# Patient Record
(demographics unavailable — no encounter records)

---

## 2024-10-10 NOTE — REASON FOR VISIT
[Follow-Up Visit] : a follow-up visit [FreeTextEntry2] : Eugene is a 15 year old girl with ADHD and anxiety seen for a follow-up visit to discuss medication management and treatment recommendations.  [FreeTextEntry4] : Lexapro 5 mg po daily (takes in the PM) Cotempla 25.9 mg po QAM (for school days and prn on non-school days) Melatonin QHS prn [FreeTextEntry1] : MARIANA Knight [FreeTextEntry5] : Medical records

## 2024-10-10 NOTE — PHYSICAL EXAM
[Normal] : awake and interactive [Smiles responsively] : smiles responsively [Positive mood] : positive mood [Answered questions appropriately] : answered questions appropriately [de-identified] : Interviewed alone. Denied vaping, ETOH, tobacco use, and drug use. Denied mood symptoms. Denied SI.

## 2024-10-10 NOTE — END OF VISIT
[Time Spent: ___ minutes] : I have spent [unfilled] minutes of time on the encounter which excludes teaching and separately reported services. [1. Privacy issue, precluded by [facility] or Federal Law] : Reason - Privacy issue, precluded by [facility] or Federal Law

## 2024-10-10 NOTE — PLAN
[Findings (To Date)] : Findings from evaluation (to date) [Goals / Benefits] : Goals & potential benefits of treatment with medication, as well as the limitations of pharmacotherapy [Stimulants] : Potential benefits and limitations of treatment with stimulant medication.  Potential adverse events were also reviewed, including insomnia, reduced appetite, change in blood pressure or heart rate, headache, stomachache, slowing of growth, moodiness, and onset of tics [AE Strategies] : Strategies to reduce side effects from current or proposed medication regimen [SSRIs] : Potential benefits and limitations of treatment with SSRIs.  Potential adverse events were also reviewed, including suicidal ideation, andrei/activation, nausea, headache, diarrhea/constipation, somnolence, vision changes, rash, etc.  Advised to seek immediate medical attention if any severe side effects or suicidal ideation. [CAM Therapies] : Benefits and limits of CAM therapies [504] : Entitlements under Section 504 of the Americans with Disabilities Act ("accommodation plans") [Family Questions] : Family's questions were addressed [Sleep] : The importance of sleep and strategies to ensure adequate sleep [FreeTextEntry3] : Current recommendations: - Will continue Cotempla 25.9 mg po QAM for school days, will monitor for efficacy and SE - Discussed potential side effects of stimulant medications including but not limited to increased heart rate and blood pressure, decreased appetite, decreased growth velocity, headache, stomachache, delayed sleep onset, tics, moodiness, etc. - Continue to monitor weight and height - Continue melatonin QHS prn - Continue Lexapro and have a trial of 7.5x 1-2 weeks and then increase to 10 mg po daily to better target anxiety and panic symptoms, monitor for efficacy and SE - Discussed potential SE of SSRIs including but not limited to suicidal ideation, andrei/activation, nausea, headache, diarrhea/constipation, somnolence, vision changes, rash, etc.  To seek immediate medical attention if any severe side effects or suicidal ideation.   - Continue 504 plan - Encouragement given about progress - Encouragement given about seeking out new therapist for CBT  Previous recommendations: - Consider adding l-theanine and magnesium in the morning to help with stress - Consider saffron to help with ADHD and PMS symptoms - s/p fish oil - The Shyness and Social Anxiety Workbook for Teens: CBT and ACT Skills to Help You Build Social Confidence by Margarita Ng - 1,2,3 Magic Teen by Dr. Kumar - Ophthalmologist recommended a genetics evaluation, provided with contact information at the last visit, pediatrician recommended holding off for now - Pediatrician is ok with Eugene waiting on COVID-19 vaccine because she is sensitive to vaccines in general  Follow-up: - Call prn and in 1 month - F/U in January 2025   Billing: Level 4 E/M due to time (30 minutes),  because patient is being followed longitudinally for at least one chronic condition by a single provider  [Diet] : Evidence-based clinical information about diet

## 2024-10-10 NOTE — REVIEW OF SYSTEMS
[Anxiety] : anxiety [Normal] : Hematologic/Lymphatic [FreeTextEntry2] : history of poor weight gain [FreeTextEntry3] : wears glasses/contact lenses, has cataracts developing, known to ophthalmology [FreeTextEntry4] : s/p palate expander,  s/p braces [FreeTextEntry5] : cleared by cardiology in the past [FreeTextEntry6] : s/p COVID-19 in January 2021 and January 2022 [de-identified] :  s/p right shoulder buckle fracture in July 2022 , s/p finger injury and ankle injury (due to cheer) [de-identified] : history of alopecia and hair thinning, eczema, previously known to dermatology [de-identified] : history of hemangioma on scalp that resolved [de-identified] : panic symptoms

## 2024-10-10 NOTE — HISTORY OF PRESENT ILLNESS
[FreeTextEntry5] : 10th grade.  Has a 504 plan. Gets counseling in a group every other week and Eugene really likes it.  [FreeTextEntry1] : Since the last visit, Eugene has not had any medication changes.   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 She has had a significant decrease in panic symptoms but it can still happen - had some panic symptoms yesterday after getting fearful about catching a GI virus from a friend. Her somatic symptoms have also decreased overall (heart racing, stomachaches, headaches, etc.). She has less social anxiety and is speaking more to unfamiliar adults. She says that she is obsessing less about school and grades.   Eugene's mother feels like her anxiety is more manageable so she is better able to engage in coping skills but does feel like there is room for improvement.   She has received CBT in the past and exploring options for a new therapist.  It has been challenging.   Her grades are excellent.   Eugene's attention is good in school and for homework.   She takes melatonin to help with sleep onset.  [de-identified] : [employer] [Major Illness] : no major illness [Surgery] : no surgery [Hospitalizations] : no hospitalizations [FreeTextEntry6] : sedation has resolved, denies SI, vivid but good dreams decreased appetite on Cotempla

## 2025-01-30 NOTE — PHYSICAL EXAM
[Normal] : awake and interactive [Smiles responsively] : smiles responsively [Positive mood] : positive mood [Answered questions appropriately] : answered questions appropriately

## 2025-01-30 NOTE — END OF VISIT
[Time Spent: ___ minutes] : I have spent [unfilled] minutes of time on the encounter which excludes teaching and separately reported services. [1. Privacy issue, precluded by [facility] or Federal Law] : Reason - Privacy issue, precluded by [facility] or Federal Law

## 2025-01-30 NOTE — PLAN
[Findings (To Date)] : Findings from evaluation (to date) [Goals / Benefits] : Goals & potential benefits of treatment with medication, as well as the limitations of pharmacotherapy [Stimulants] : Potential benefits and limitations of treatment with stimulant medication.  Potential adverse events were also reviewed, including insomnia, reduced appetite, change in blood pressure or heart rate, headache, stomachache, slowing of growth, moodiness, and onset of tics [AE Strategies] : Strategies to reduce side effects from current or proposed medication regimen [SSRIs] : Potential benefits and limitations of treatment with SSRIs.  Potential adverse events were also reviewed, including suicidal ideation, andrei/activation, nausea, headache, diarrhea/constipation, somnolence, vision changes, rash, etc.  Advised to seek immediate medical attention if any severe side effects or suicidal ideation. [CAM Therapies] : Benefits and limits of CAM therapies [504] : Entitlements under Section 504 of the Americans with Disabilities Act ("accommodation plans") [Family Questions] : Family's questions were addressed [Diet] : Evidence-based clinical information about diet [Sleep] : The importance of sleep and strategies to ensure adequate sleep [Counseling] : Benefits and limits of counseling or therapy [Resources] : Other available resources [FreeTextEntry3] :  Current recommendations: - Will continue Cotempla and decrease to 17.3 mg po QAM for school days, will see if effective, can increase back to 25.9 mg if needed, will monitor for efficacy and SE - Discussed potential side effects of stimulant medications including but not limited to increased heart rate and blood pressure, decreased appetite, decreased growth velocity, headache, stomachache, delayed sleep onset, tics, moodiness, etc. - Continue to monitor weight and height - Continue to monitor feelings of her heart racing, advised to discontinue all caffeine and monitor symptoms, referred back to cardiology since she does have panic attacks and hypochondriasis - Continue melatonin QHS prn - Continue Lexapro 10 mg po daily to better target anxiety and panic symptoms, monitor for efficacy and SE - Discussed potential SE of SSRIs including but not limited to suicidal ideation, andrei/activation, nausea, headache, diarrhea/constipation, somnolence, vision changes, rash, etc.  To seek immediate medical attention if any severe side effects or suicidal ideation.   - Continue 504 plan - Encouragement given about progress - Encouragement given about seeking out new therapist for CBT/ERP, referred to [email or web address]   Previous recommendations: - Consider adding l-theanine and magnesium in the morning to help with stress - Consider saffron to help with ADHD and PMS symptoms - s/p fish oil - The Shyness and Social Anxiety Workbook for Teens: CBT and ACT Skills to Help You Build Social Confidence by Margarita Ng - 1,2,3 Magic Teen by Dr. Kumar - Ophthalmologist recommended a genetics evaluation, provided with contact information at the last visit, pediatrician recommended holding off for now - Pediatrician is ok with Eugene waiting on COVID-19 vaccine because she is sensitive to vaccines in general  Follow-up: - Call prn  - F/U in April 2025   Billing: Level 5 E/M due to time (40 minutes face-to-face time, 5 minutes documentation and review of records),  because patient is being followed longitudinally for at least one chronic condition by a single provider

## 2025-01-30 NOTE — REVIEW OF SYSTEMS
[Anxiety] : anxiety [Normal] : Hematologic/Lymphatic [FreeTextEntry2] : history of poor weight gain [FreeTextEntry3] : wears glasses/contact lenses, has cataracts developing, known to ophthalmology [FreeTextEntry4] : s/p palate expander,  s/p braces [FreeTextEntry5] : cleared by cardiology in the past [FreeTextEntry6] : s/p COVID-19 in January 2021 and January 2022 [de-identified] :  s/p right shoulder buckle fracture in July 2022 , s/p finger injury and ankle injury (due to cheer) [de-identified] : history of alopecia and hair thinning, eczema, previously known to dermatology [de-identified] : history of hemangioma on scalp that resolved [de-identified] : panic symptoms

## 2025-01-30 NOTE — REASON FOR VISIT
[Follow-Up Visit] : a follow-up visit [FreeTextEntry2] : Eugene is a 15-year-old girl with ADHD and anxiety seen for a follow-up visit to discuss medication management and treatment recommendations.  [FreeTextEntry4] : Lexapro 10 mg po daily (takes in the PM) Cotempla 25.9 mg po QAM (for school days and prn on non-school days) Melatonin QHS prn [FreeTextEntry1] : MARIANA Knight [FreeTextEntry5] : Medical records

## 2025-04-10 NOTE — REASON FOR VISIT
[Follow-Up Visit] : a follow-up visit [FreeTextEntry2] : Eugene is a 16-year-old girl with ADHD and anxiety seen for a follow-up visit to discuss medication management and treatment recommendations.  [FreeTextEntry4] : Lexapro 10 mg po daily (takes in the PM) Cotempla 17.3 mg po QAM (for school days and prn on non-school days) Melatonin QHS prn [FreeTextEntry1] : MARIANA Knight [FreeTextEntry5] : Medical records, rating scales

## 2025-04-10 NOTE — PLAN
[Findings (To Date)] : Findings from evaluation (to date) [Goals / Benefits] : Goals & potential benefits of treatment with medication, as well as the limitations of pharmacotherapy [Stimulants] : Potential benefits and limitations of treatment with stimulant medication.  Potential adverse events were also reviewed, including insomnia, reduced appetite, change in blood pressure or heart rate, headache, stomachache, slowing of growth, moodiness, and onset of tics [AE Strategies] : Strategies to reduce side effects from current or proposed medication regimen [SSRIs] : Potential benefits and limitations of treatment with SSRIs.  Potential adverse events were also reviewed, including suicidal ideation, andrei/activation, nausea, headache, diarrhea/constipation, somnolence, vision changes, rash, etc.  Advised to seek immediate medical attention if any severe side effects or suicidal ideation. [CAM Therapies] : Benefits and limits of CAM therapies [Counseling] : Benefits and limits of counseling or therapy [Resources] : Other available resources [504] : Entitlements under Section 504 of the Americans with Disabilities Act ("accommodation plans") [Family Questions] : Family's questions were addressed [Diet] : Evidence-based clinical information about diet [Sleep] : The importance of sleep and strategies to ensure adequate sleep [Rating Scales] : Clinical implications of rating scales [FreeTextEntry3] : Current recommendations: - Will continue Cotempla 17.3 mg po QAM for school days, will monitor for efficacy and SE - Discussed potential side effects of stimulant medications including but not limited to increased heart rate and blood pressure, decreased appetite, decreased growth velocity, headache, stomachache, delayed sleep onset, tics, moodiness, etc. - Continue melatonin QHS prn - Continue Lexapro  and have a trial of 15 mg po daily to better target anxiety and panic symptoms, monitor for efficacy and SE - Discussed potential SE of SSRIs including but not limited to suicidal ideation, andrei/activation, nausea, headache, diarrhea/constipation, somnolence, vision changes, rash, etc.  To seek immediate medical attention if any severe side effects or suicidal ideation.   - Continue 504 plan - Encouragement given about progress  Previous recommendations: - Continue to monitor weight and height - Encouragement given about seeking out new therapist for CBT/ERP, referred to [email or web address]  - Consider adding l-theanine and magnesium in the morning to help with stress - Consider saffron to help with ADHD and PMS symptoms - s/p fish oil - The Shyness and Social Anxiety Workbook for Teens: CBT and ACT Skills to Help You Build Social Confidence by Margarita Ng - 1,2,3 Magic Teen by Dr. Kumar - Ophthalmologist recommended a genetics evaluation, provided with contact information at the last visit, pediatrician recommended holding off for now - Pediatrician is ok with Eugene waiting on COVID-19 vaccine because she is sensitive to vaccines in general  Follow-up: - Call prn  - F/U in May 2025   Billing: Level 4 E/M due to time (30 minutes),  because patient is being followed longitudinally for at least one chronic condition by a single provider, [id number] x 2 for rating scales

## 2025-04-10 NOTE — REVIEW OF SYSTEMS
[Anxiety] : anxiety [Normal] : Hematologic/Lymphatic [FreeTextEntry2] : history of poor weight gain [FreeTextEntry3] : wears glasses/contact lenses, has cataracts developing, known to ophthalmology [FreeTextEntry4] : s/p palate expander,  s/p braces [FreeTextEntry5] : cleared by cardiology in the past [FreeTextEntry6] : s/p COVID-19 in January 2021 and January 2022 [de-identified] :  s/p right shoulder buckle fracture in July 2022 , s/p finger injury and ankle injury (due to cheer) [de-identified] : history of alopecia and hair thinning, eczema, previously known to dermatology [de-identified] : history of hemangioma on scalp that resolved [de-identified] : panic symptoms

## 2025-04-10 NOTE — REVIEW OF SYSTEMS
[Anxiety] : anxiety [Normal] : Hematologic/Lymphatic [FreeTextEntry2] : history of poor weight gain [FreeTextEntry3] : wears glasses/contact lenses, has cataracts developing, known to ophthalmology [FreeTextEntry4] : s/p palate expander,  s/p braces [FreeTextEntry5] : cleared by cardiology in the past [FreeTextEntry6] : s/p COVID-19 in January 2021 and January 2022 [de-identified] :  s/p right shoulder buckle fracture in July 2022 , s/p finger injury and ankle injury (due to cheer) [de-identified] : history of alopecia and hair thinning, eczema, previously known to dermatology [de-identified] : history of hemangioma on scalp that resolved [de-identified] : panic symptoms

## 2025-04-10 NOTE — END OF VISIT
[Time Spent: ___ minutes] : I have spent [unfilled] minutes of time on the encounter which excludes teaching and separately reported services. [1. Privacy issue, precluded by [facility] or Federal Law] : Reason - Privacy issue, precluded by [facility] or Federal Law

## 2025-04-10 NOTE — HISTORY OF PRESENT ILLNESS
[FreeTextEntry5] : 10th grade.  Has a 504 plan. Gets counseling in a group every other week and Eugene really likes it.  [FreeTextEntry1] : Since the last visit, Eugene has continued on Lexapro 10 mg po daily. It is well-tolerated. She feels like there is room for improvement in terms of her anxiety symptoms and panic symptoms.   Eugene has not had any racing heart rate since she went down on Cotempla. Family decided to hold off on seeing cardiology again since she had been cleared in the past and it did seem to be associated with the higher dose of Cotempla +/- caffeine. She has stopped drinking caffeine as well.   Eugene feels that the Cotempla is effective and that the lower dose is targeting her symptoms well. Eugene's attention is good in school and for homework.   She has received CBT in the past. It has been challenging finding a therapist.   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 She can have intrusive thoughts. This has improved on Lexapro but persists.   Her grades are excellent. Her GPA is 4.09. She is very independent with her work. She is interested in a career in criminal justice and/or psychology. She is going on a [place] trip [place] and seems excited and anxious about it.   She takes melatonin to help with sleep onset.  [de-identified] : [employer] [Major Illness] : no major illness [Surgery] : no surgery [Hospitalizations] : no hospitalizations [FreeTextEntry6] : sedation has resolved, denies SI, vivid but good dreams decreased appetite on Cotempla

## 2025-04-10 NOTE — HISTORY OF PRESENT ILLNESS
[FreeTextEntry5] : 10th grade.  Has a 504 plan. Gets counseling in a group every other week and Eugene really likes it.  [FreeTextEntry1] : Since the last visit, Eugene has continued on Lexapro 10 mg po daily. It is well-tolerated. She feels like there is room for improvement in terms of her anxiety symptoms and panic symptoms.   Eugene has not had any racing heart rate since she went down on Cotempla. Family decided to hold off on seeing cardiology again since she had been cleared in the past and it did seem to be associated with the higher dose of Cotempla +/- caffeine. She has stopped drinking caffeine as well.   Eugene feels that the Cotempla is effective and that the lower dose is targeting her symptoms well. Eugene's attention is good in school and for homework.   She has received CBT in the past. It has been challenging finding a therapist.   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 She can have intrusive thoughts. This has improved on Lexapro but persists.   Her grades are excellent. Her GPA is 4.09. She is very independent with her work. She is interested in a career in criminal justice and/or psychology. She is going on a school field trip to the retirement and seems excited and anxious about it.   She takes melatonin to help with sleep onset.  [de-identified] : [place] [employer] [Major Illness] : no major illness [Surgery] : no surgery [Hospitalizations] : no hospitalizations [FreeTextEntry6] : sedation has resolved, denies SI, vivid but good dreams decreased appetite on Cotempla

## 2025-06-25 NOTE — END OF VISIT
[Time Spent: ___ minutes] : I have spent [unfilled] minutes of time on the encounter which excludes teaching and separately reported services. [1. Privacy issue, precluded by [facility] or Federal Law] : Reason - Privacy issue, precluded by [facility] or Federal Law

## 2025-06-25 NOTE — REVIEW OF SYSTEMS
[Anxiety] : anxiety [Normal] : Hematologic/Lymphatic [FreeTextEntry2] : history of poor weight gain [FreeTextEntry3] : wears glasses/contact lenses, has cataracts developing, known to ophthalmology [FreeTextEntry4] : s/p palate expander,  s/p braces [FreeTextEntry5] : cleared by cardiology in the past [FreeTextEntry6] : s/p COVID-19 in January 2021 and January 2022 [de-identified] :  s/p right shoulder buckle fracture in July 2022 , s/p finger injury and ankle injury (due to cheer) [de-identified] : history of alopecia and hair thinning, eczema, previously known to dermatology [de-identified] : history of hemangioma on scalp that resolved [de-identified] : panic symptoms

## 2025-06-25 NOTE — REVIEW OF SYSTEMS
[Anxiety] : anxiety [Normal] : Hematologic/Lymphatic [FreeTextEntry2] : history of poor weight gain [FreeTextEntry3] : wears glasses/contact lenses, has cataracts developing, known to ophthalmology [FreeTextEntry4] : s/p palate expander,  s/p braces [FreeTextEntry5] : cleared by cardiology in the past [FreeTextEntry6] : s/p COVID-19 in January 2021 and January 2022 [de-identified] :  s/p right shoulder buckle fracture in July 2022 , s/p finger injury and ankle injury (due to cheer) [de-identified] : history of alopecia and hair thinning, eczema, previously known to dermatology [de-identified] : history of hemangioma on scalp that resolved [de-identified] : panic symptoms

## 2025-06-25 NOTE — PLAN
[Findings (To Date)] : Findings from evaluation (to date) [Rating Scales] : Clinical implications of rating scales [Goals / Benefits] : Goals & potential benefits of treatment with medication, as well as the limitations of pharmacotherapy [Stimulants] : Potential benefits and limitations of treatment with stimulant medication.  Potential adverse events were also reviewed, including insomnia, reduced appetite, change in blood pressure or heart rate, headache, stomachache, slowing of growth, moodiness, and onset of tics [AE Strategies] : Strategies to reduce side effects from current or proposed medication regimen [SSRIs] : Potential benefits and limitations of treatment with SSRIs.  Potential adverse events were also reviewed, including suicidal ideation, andrei/activation, nausea, headache, diarrhea/constipation, somnolence, vision changes, rash, etc.  Advised to seek immediate medical attention if any severe side effects or suicidal ideation. [CAM Therapies] : Benefits and limits of CAM therapies [Counseling] : Benefits and limits of counseling or therapy [Resources] : Other available resources [504] : Entitlements under Section 504 of the Americans with Disabilities Act ("accommodation plans") [Family Questions] : Family's questions were addressed [Diet] : Evidence-based clinical information about diet [Sleep] : The importance of sleep and strategies to ensure adequate sleep [FreeTextEntry3] :  Current recommendations: - Will continue Cotempla 17.3 mg po QAM for school days, will monitor for efficacy and SE - Discussed potential side effects of stimulant medications including but not limited to increased heart rate and blood pressure, decreased appetite, decreased growth velocity, headache, stomachache, delayed sleep onset, tics, moodiness, etc. - Continue melatonin QHS prn - Continue Lexapro 15 mg po daily to target anxiety and panic symptoms, monitor for efficacy and SE - Discussed potential SE of SSRIs including but not limited to suicidal ideation, andrei/activation, nausea, headache, diarrhea/constipation, somnolence, vision changes, rash, etc.  To seek immediate medical attention if any severe side effects or suicidal ideation.   - Continue 504 plan - Encouragement given about progress  Previous recommendations: - Continue to monitor weight and height - Encouragement given about seeking out new therapist for CBT/ERP, [facility] to [email or web address]  - Consider adding l-theanine and magnesium in the morning to help with stress - Consider saffron to help with ADHD and PMS symptoms - s/p fish oil - The Shyness and Social Anxiety Workbook for Teens: CBT and ACT Skills to Help You Build Social Confidence by Margarita Ng - 1,2,3 Magic Teen by Dr. Kumar - Ophthalmologist recommended a genetics evaluation, provided with contact information at the last visit, pediatrician recommended holding off for now - Pediatrician is ok with Eugene waiting on COVID-19 vaccine because she is sensitive to vaccines in general  Follow-up: - Call prn  - F/U in Fall 2025   Billing: Level 5 E/M due to time (40 minutes),  because patient is being followed longitudinally for at least one chronic condition by a single provider

## 2025-06-25 NOTE — END OF VISIT
[Time Spent: ___ minutes] : I have spent [unfilled] minutes of time on the encounter which excludes teaching and separately reported services. [1. Privacy issue, precluded by [facility] or Federal Law] : Reason - Privacy issue, precluded by [facility] or Federal Law

## 2025-06-25 NOTE — REASON FOR VISIT
[Follow-Up Visit] : a follow-up visit [Mother] : mother [Home] : at home, [unfilled] , at the time of the visit. [Other Location: e.g. Home (Enter Location, City,State)___] : at [unfilled] [Telehealth (audio & video)] : This visit was provided via telehealth using real-time 2-way audio visual technology. [FreeTextEntry3] : Mother [FreeTextEntry2] : Eugene is a 16-year-old girl with ADHD and anxiety seen for a follow-up visit to discuss medication management and treatment recommendations.  [FreeTextEntry4] : Lexapro 15 mg po daily (takes in the PM) Cotempla 17.3 mg po QAM (for school days and prn on non-school days) Melatonin 1 mg po QHS prn [FreeTextEntry1] : MARIANA Knight [FreeTextEntry5] : Medical records

## 2025-06-25 NOTE — HISTORY OF PRESENT ILLNESS
[FreeTextEntry5] : 10th grade.  Has a 504 plan. Gets counseling in a group every other week and Eugene really likes it.  [FreeTextEntry1] : Since the last visit, Eugene has continued on Lexapro and increased from 10 to 15 mg po daily. It is well-tolerated. She feels like it is helping with her anxiety and panic symptoms. She cannot remember the last time that she had a panic attack. She also had a significant decrease in her social anxiety and now feels like a "social butterfly."   Eugene has not had any racing heart rate since she went down on Cotempla. Family decided to hold off on seeing cardiology again since she had been cleared in the past and it did seem to be associated with the higher dose of Cotempla +/- caffeine. She has stopped drinking caffeine as well  Eugene feels that the Cotempla is effective and that the lower dose is targeting her symptoms well. Eugene's attention is good in school and for homework. She feels like her attention span is a 7-8/10 for school.   She has received CBT in the past. It has been challenging finding a therapist. She would like to work with someone in-person.   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 She can have intrusive thoughts, but there are more manageable. This has improved on Lexapro but persists.   Her grades are excellent. Her GPA is 4.1. She is very independent with her work. She is interested in a career in criminal justice and/or psychology.  She takes melatonin to help with sleep onset. She is feeling less anxious at night.   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 She has feelings of irritability and anger with certain sounds (e.g., her mother chewing or coughing).  [de-identified] : Cheerleading Track [Major Illness] : no major illness [Surgery] : no surgery [Hospitalizations] : no hospitalizations [FreeTextEntry6] : sedation has resolved, denies SI, vivid but good dreams decreased appetite on Cotempla

## 2025-06-25 NOTE — HISTORY OF PRESENT ILLNESS
[FreeTextEntry5] : 10th grade.  Has a 504 plan. Gets counseling in a group every other week and Eugene really likes it.  [FreeTextEntry1] : Since the last visit, Eugene has continued on Lexapro and increased from 10 to 15 mg po daily. It is well-tolerated. She feels like it is helping with her anxiety and panic symptoms. She cannot remember the last time that she had a panic attack. She also had a significant decrease in her social anxiety and now feels like a "social butterfly."   Eugene has not had any racing heart rate since she went down on Cotempla. Family decided to hold off on seeing cardiology again since she had been cleared in the past and it did seem to be associated with the higher dose of Cotempla +/- caffeine. She has stopped drinking caffeine as well  Eugene feels that the Cotempla is effective and that the lower dose is targeting her symptoms well. Eugene's attention is good in school and for homework. She feels like her attention span is a 7-8/10 for school.   She has received CBT in the past. It has been challenging finding a therapist. She would like to work with someone in-person.   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 She can have intrusive thoughts, but there are more manageable. This has improved on Lexapro but persists.   Her grades are excellent. Her GPA is 4.1. She is very independent with her work. She is interested in a career in criminal justice and/or psychology.  She takes melatonin to help with sleep onset. She is feeling less anxious at night.   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 She has feelings of irritability and anger with certain sounds (e.g., her mother chewing or coughing).  [de-identified] : Cheerleading Track [Major Illness] : no major illness [Surgery] : no surgery [Hospitalizations] : no hospitalizations [FreeTextEntry6] : sedation has resolved, denies SI, vivid but good dreams decreased appetite on Cotempla

## 2025-06-26 NOTE — HISTORY OF PRESENT ILLNESS
[Sudden] : sudden [Dull/Aching] : dull/aching [Throbbing] : throbbing [Intermittent] : intermittent [Household chores] : household chores [Leisure] : leisure [Social interactions] : social interactions [Rest] : rest [Student] : Work status: student [6] : 6 [1] : 2 [Sharp] : sharp [Stabbing] : stabbing [de-identified] : Patient here for right ankle. Patient states she rolled ankle 6/2025. Patient states she has rolled this ankle 3-4x due to cheerleading. Patient states pain has been on/off since 2018. Patient states pain is in the lateral aspect of the ankle that is aching and sharp and shooting with impact related activity. Patient came into office ambulating on her own in sneakers with mother.  [] : Post Surgical Visit: no [FreeTextEntry1] : Right ankle  [FreeTextEntry3] : 2018 [FreeTextEntry5] : [facility]  [de-identified] : Movement  not applicable (Male)

## 2025-06-26 NOTE — PHYSICAL EXAM
[de-identified] : Examination of the right foot and ankle is as follows: INSPECTION: no swelling, no ecchymosis, no abrasion, laceration, no erythema PALPATION: lateral ligament tenderness ROM: plantarflexion 40 degrees, inversion 30 degrees, eversion 20 degrees, dorsiflexion 20 degrees STRENGTH: dorsiflexion 5/5, plantar flexion 5/5, inversion 5/5, eversion 5/5, EHL 5/5, FHL 5/5 TESTING: + anterior drawer VASCULAR: dorsalis pedis pulse: 2+, posterior tibialis pulse: 2+ NEURO: Sensation present to light touch in all distributions GAIT: mildly antalgic, ambulation without assisted devices   X-rays of the right ankle is as follows: Ankle 3 view AP/Lateral/Oblique:: No fractures, subluxations or dislocations. No major abnormalities.

## 2025-06-26 NOTE — ASSESSMENT
[FreeTextEntry1] : 16yoF with right ankle instability for past several years.  Will trial conservative management at this time.  -rx for PT given -use ankle brace for strenuous activities -Activities as tolerate -Rest, ice, compression, elevation, NSAIDs PRN for pain. -All questions answered -F/u 4 weeks    The diagnosis was explained in detail. The potential non-surgical and surgical treatments were reviewed. The relative risks and benefits of each option were considered relative to the patients age, activity level, medical history, symptom severity and previously attempted treatments.   The patient was advised to consult with their primary medical provider prior to initiation of any new medications to reduce the risk of adverse effects specific to their long-term home medications and medical history.   The patient's current medications management of their orthopedic diagnosis was reviewed today:   1) We discussed a comprehensive treatment plan that included possible pharmaceutical management involving the use of prescription strength medications including but not limited to options as oral Naprosyn 500mg BID, once daily Meloxicam 15 mg, or 500-650 mg Tylenol versus over-the-counter oral medications and topical prescriptions NSAID Pennsaid vs over the counter Voltaren gel.   2) There is a moderate risk of morbidity with further treatment, especially from use of prescription strength medications and possible side effects of these medications which include upset stomach with oral medications, skin reactions to topical medications and cardiac/renal issues with long term use.   3) I recommended that the patient follow-up with their medical physician to discuss any significant specific potential issues with long term medication use such as interactions with current medications or with exacerbation of underlying medical comorbidities.   4) The benefits and risks associated with use of injectable, oral or topical, prescription and over the counter anti-inflammatory medications were discussed with the patient. The patient voiced understanding of the risks including but not limited to bleeding, stroke, kidney dysfunction, heart disease, and were referred to the black box warning label for further information